# Patient Record
Sex: FEMALE | Race: BLACK OR AFRICAN AMERICAN | Employment: UNEMPLOYED | URBAN - METROPOLITAN AREA
[De-identification: names, ages, dates, MRNs, and addresses within clinical notes are randomized per-mention and may not be internally consistent; named-entity substitution may affect disease eponyms.]

---

## 2024-08-23 ENCOUNTER — HOSPITAL ENCOUNTER (EMERGENCY)
Age: 34
Discharge: HOME | End: 2024-08-23

## 2024-08-23 VITALS
RESPIRATION RATE: 16 BRPM | TEMPERATURE: 99 F | HEART RATE: 80 BPM | DIASTOLIC BLOOD PRESSURE: 69 MMHG | OXYGEN SATURATION: 100 % | WEIGHT: 134 LBS | SYSTOLIC BLOOD PRESSURE: 119 MMHG

## 2024-08-23 DIAGNOSIS — R51.9 NONINTRACTABLE HEADACHE, UNSPECIFIED CHRONICITY PATTERN, UNSPECIFIED HEADACHE TYPE: Primary | ICD-10-CM

## 2024-08-23 DIAGNOSIS — B34.9 VIRAL ILLNESS: ICD-10-CM

## 2024-08-23 LAB
FLUAV RNA RESP QL NAA+PROBE: NOT DETECTED
FLUBV RNA RESP QL NAA+PROBE: NOT DETECTED
SARS-COV-2 RNA RESP QL NAA+PROBE: NOT DETECTED

## 2024-08-23 PROCEDURE — 6370000000 HC RX 637 (ALT 250 FOR IP): Performed by: PHYSICIAN ASSISTANT

## 2024-08-23 PROCEDURE — 87636 SARSCOV2 & INF A&B AMP PRB: CPT

## 2024-08-23 PROCEDURE — 99283 EMERGENCY DEPT VISIT LOW MDM: CPT

## 2024-08-23 RX ORDER — ACETAMINOPHEN 500 MG
1000 TABLET ORAL ONCE
Status: COMPLETED | OUTPATIENT
Start: 2024-08-23 | End: 2024-08-23

## 2024-08-23 RX ADMIN — ACETAMINOPHEN 1000 MG: 500 TABLET ORAL at 11:39

## 2024-08-23 ASSESSMENT — PAIN DESCRIPTION - PAIN TYPE: TYPE: ACUTE PAIN

## 2024-08-23 ASSESSMENT — PAIN - FUNCTIONAL ASSESSMENT
PAIN_FUNCTIONAL_ASSESSMENT: 0-10
PAIN_FUNCTIONAL_ASSESSMENT: ACTIVITIES ARE NOT PREVENTED

## 2024-08-23 ASSESSMENT — PAIN DESCRIPTION - LOCATION: LOCATION: HEAD

## 2024-08-23 ASSESSMENT — PAIN SCALES - GENERAL: PAINLEVEL_OUTOF10: 7

## 2024-08-23 ASSESSMENT — PAIN DESCRIPTION - DESCRIPTORS: DESCRIPTORS: DISCOMFORT

## 2024-08-23 NOTE — ED PROVIDER NOTES
[facility] EMERGENCY DEPARTMENT  EMERGENCY DEPARTMENT ENCOUNTER        Pt Name: Yair Wilkerson  MRN: [id number]  Birthdate 1990  Date of evaluation: 8/23/2024  Provider: Lupillo Son PA-C  PCP: No primary care provider on file.  Note Started: 11:54 AM EDT 8/23/24      CHING. I have evaluated this patient.        CHIEF COMPLAINT       Chief Complaint   Patient presents with    Cough     Started yesterday    Fever    Headache     On going since monday       HISTORY OF PRESENT ILLNESS: 1 or more Elements     History From: patient  Limitations to history : Language slight language barrier.  Patient understands English but does speak [place]ian.  When she does not understand her boyfriend at bedside interprets for her per patient request    Yair Wilkerson is a 34 y.o. female who presents to the emergency department with a chief complaint of headache that has been ongoing for a few days along with fatigue.  Began with some tactile fevers and a mild cough yesterday.  She is currently about 7 weeks pregnant by dates with last normal menstrual cycle on 7/2.  She is currently on prenatal vitamins.  This is her first pregnancy.  She denies abdominal pain, vaginal bleeding, vaginal discharge, dysuria, hematuria, diarrhea, bloody stool.  Is had a mild cough but denies shortness of breath, chest pain.  Did not check her temperature.  Denies sinus congestion or sore throat.  Denies any ongoing medical issues, recent travel or recent sick contacts.    Nursing Notes were all reviewed and agreed with or any disagreements were addressed in the HPI.    REVIEW OF SYSTEMS :      Review of Systems    Positives and Pertinent negatives as per HPI.     SURGICAL HISTORY   No past surgical history on file.    CURRENTMEDICATIONS       There are no discharge medications for this patient.      ALLERGIES     Patient has no known allergies.    FAMILYHISTORY     No family history on file.     SOCIAL HISTORY

## 2024-08-27 PROBLEM — Z3A.01 LESS THAN 8 WEEKS GESTATION OF PREGNANCY: Status: ACTIVE | Noted: 2024-08-27
